# Patient Record
Sex: FEMALE | Race: WHITE | NOT HISPANIC OR LATINO | URBAN - NONMETROPOLITAN AREA
[De-identification: names, ages, dates, MRNs, and addresses within clinical notes are randomized per-mention and may not be internally consistent; named-entity substitution may affect disease eponyms.]

---

## 2019-05-15 ENCOUNTER — OFFICE VISIT (OUTPATIENT)
Dept: URBAN - NONMETROPOLITAN AREA CLINIC 18 | Facility: CLINIC | Age: 49
End: 2019-05-15
Payer: COMMERCIAL

## 2019-05-15 DIAGNOSIS — H18.613 KERATOCONUS, STABLE, BILATERAL: ICD-10-CM

## 2019-05-15 DIAGNOSIS — R51 HEADACHE: ICD-10-CM

## 2019-05-15 PROCEDURE — 92015 DETERMINE REFRACTIVE STATE: CPT | Performed by: OPTOMETRIST

## 2019-05-15 PROCEDURE — 92014 COMPRE OPH EXAM EST PT 1/>: CPT | Performed by: OPTOMETRIST

## 2019-05-15 ASSESSMENT — INTRAOCULAR PRESSURE
OS: 14
OD: 14

## 2019-05-15 ASSESSMENT — VISUAL ACUITY
OS: 20/70
OD: 20/30

## 2019-05-15 ASSESSMENT — KERATOMETRY
OS: 46.13
OD: 46.00

## 2019-05-15 NOTE — IMPRESSION/PLAN
Impression: Keratoconus, stable, bilateral: H18.613. Plan: Discussed diagnosis in detail with patient. Keratoconus OU, adelina today. Will monitor.

## 2019-05-15 NOTE — IMPRESSION/PLAN
Impression: Headache: R51. Plan: Continue care with PCP and neurologist. Low prism Rx to help alignment. Also education for potential lens tints to help reduce symptoms provided.

## 2019-05-15 NOTE — IMPRESSION/PLAN
Impression: Degenerative myopia, left eye: H44.22. Plan: Discussed diagnosis with patient and reviewed signs and symptoms to retinal detachment. Will monitor in 1 year with OCT-MAC.

## 2020-10-02 ENCOUNTER — OFFICE VISIT (OUTPATIENT)
Dept: URBAN - NONMETROPOLITAN AREA CLINIC 18 | Facility: CLINIC | Age: 50
End: 2020-10-02
Payer: COMMERCIAL

## 2020-10-02 DIAGNOSIS — H44.22 DEGENERATIVE MYOPIA, LEFT EYE: ICD-10-CM

## 2020-10-02 DIAGNOSIS — H52.13 MYOPIA, BILATERAL: Primary | ICD-10-CM

## 2020-10-02 DIAGNOSIS — H25.13 AGE-RELATED NUCLEAR CATARACT, BILATERAL: ICD-10-CM

## 2020-10-02 DIAGNOSIS — H35.412 LATTICE DEGENERATION OF RETINA, LEFT EYE: ICD-10-CM

## 2020-10-02 PROCEDURE — 92014 COMPRE OPH EXAM EST PT 1/>: CPT | Performed by: OPTOMETRIST

## 2020-10-02 ASSESSMENT — INTRAOCULAR PRESSURE
OD: 13
OS: 13

## 2020-10-02 ASSESSMENT — VISUAL ACUITY
OS: 20/50
OD: 20/70

## 2020-10-02 NOTE — IMPRESSION/PLAN
Impression: Keratoconus, stable, bilateral: H18.613. Plan: Discussed diagnosis in detail with patient. Keratoconus OU, adelina at last visit. Will monitor.

## 2020-10-02 NOTE — IMPRESSION/PLAN
Impression: Degenerative myopia, left eye: H44.22. Plan: Discussed diagnosis with patient and reviewed signs and symptoms to retinal detachment. OCT macula OU today, -SRNM. Will monitor in 1 year.

## 2021-10-20 ENCOUNTER — OFFICE VISIT (OUTPATIENT)
Dept: URBAN - NONMETROPOLITAN AREA CLINIC 18 | Facility: CLINIC | Age: 51
End: 2021-10-20
Payer: COMMERCIAL

## 2021-10-20 DIAGNOSIS — H35.3131 NONEXUDATIVE AGE-RELATED MACULAR DEGENERATION, BILATERAL, EARLY DRY STAGE: ICD-10-CM

## 2021-10-20 DIAGNOSIS — H44.23 DEGENERATIVE MYOPIA, BILATERAL: ICD-10-CM

## 2021-10-20 DIAGNOSIS — H02.539 EYELID RETRACTION: ICD-10-CM

## 2021-10-20 PROCEDURE — 92014 COMPRE OPH EXAM EST PT 1/>: CPT | Performed by: OPTOMETRIST

## 2021-10-20 ASSESSMENT — INTRAOCULAR PRESSURE
OD: 15
OS: 15

## 2021-10-20 ASSESSMENT — VISUAL ACUITY
OD: 20/40
OS: 20/50

## 2021-10-20 NOTE — IMPRESSION/PLAN
Impression: Degenerative myopia, bilateral: H44.23. Plan: Discussed diagnosis with patient and reviewed signs and symptoms to retinal detachment. Last OCT macula OU, -SRNM. Will monitor in 1 year.

## 2021-10-20 NOTE — IMPRESSION/PLAN
Impression: Eyelid retraction: H02.539. Plan: Recommend follow up with PCP for thyroid concern. +Family history of thyroid disease, paternal aunt.

## 2021-10-20 NOTE — IMPRESSION/PLAN
Impression: Keratoconus, stable, bilateral: H18.613. Plan: Discussed diagnosis in detail with patient. Keratoconus OU, adelina with auto today, stable vs last. Will monitor.

## 2022-05-27 ENCOUNTER — OFFICE VISIT (OUTPATIENT)
Dept: URBAN - NONMETROPOLITAN AREA CLINIC 18 | Facility: CLINIC | Age: 52
End: 2022-05-27
Payer: MEDICARE

## 2022-05-27 DIAGNOSIS — H18.613 KERATOCONUS, STABLE, BILATERAL: ICD-10-CM

## 2022-05-27 DIAGNOSIS — H04.123 DRY EYE SYNDROME OF BILATERAL LACRIMAL GLANDS: ICD-10-CM

## 2022-05-27 DIAGNOSIS — H44.22 DEGENERATIVE MYOPIA, LEFT EYE: ICD-10-CM

## 2022-05-27 DIAGNOSIS — H35.3131 NONEXUDATIVE AGE-RELATED MACULAR DEGENERATION, BILATERAL, EARLY DRY STAGE: Primary | ICD-10-CM

## 2022-05-27 DIAGNOSIS — H35.412 LATTICE DEGENERATION OF RETINA, LEFT EYE: ICD-10-CM

## 2022-05-27 PROCEDURE — 99213 OFFICE O/P EST LOW 20 MIN: CPT | Performed by: OPTOMETRIST

## 2022-05-27 PROCEDURE — 92134 CPTRZ OPH DX IMG PST SGM RTA: CPT | Performed by: OPTOMETRIST

## 2022-05-27 ASSESSMENT — INTRAOCULAR PRESSURE
OD: 13
OS: 14

## 2022-05-27 NOTE — IMPRESSION/PLAN
Impression: Nonexudative age-related macular degeneration, bilateral, early dry stage: H35.3131. Plan: Discussed condition in detail with patient with review of macula status and potential symptoms. Discussed Amsler grid monitoring. OCT macula today, stable. Patient knows to return to clinic immediately if any changes in vision are experienced for additional evaluation.

## 2022-05-27 NOTE — IMPRESSION/PLAN
Impression: Keratoconus, stable, bilateral: H18.613. Plan: Discussed diagnosis in detail with patient. Keratoconus OU,  stable. Repeat Sukumar at next exam.  Will monitor.

## 2022-05-27 NOTE — IMPRESSION/PLAN
Impression: Lattice degeneration of retina, left eye: H35.412. Plan: A thorough review of the ocular findings was discussed. Signs and symptoms of retinal detachment (new flashes, floaters, or peripheral vision changes) reviewed in detail. Patient knows to RTC immediately if symptoms are experienced.

## 2023-02-28 ENCOUNTER — OFFICE VISIT (OUTPATIENT)
Dept: URBAN - NONMETROPOLITAN AREA CLINIC 18 | Facility: CLINIC | Age: 53
End: 2023-02-28
Payer: MEDICARE

## 2023-02-28 DIAGNOSIS — H18.613 KERATOCONUS, STABLE, BILATERAL: Primary | ICD-10-CM

## 2023-02-28 DIAGNOSIS — H04.123 DRY EYE SYNDROME OF BILATERAL LACRIMAL GLANDS: ICD-10-CM

## 2023-02-28 DIAGNOSIS — H25.13 AGE-RELATED NUCLEAR CATARACT, BILATERAL: ICD-10-CM

## 2023-02-28 DIAGNOSIS — H35.412 LATTICE DEGENERATION OF RETINA, LEFT EYE: ICD-10-CM

## 2023-02-28 DIAGNOSIS — H44.23 DEGENERATIVE MYOPIA, BILATERAL: ICD-10-CM

## 2023-02-28 PROCEDURE — 92025 CPTRIZED CORNEAL TOPOGRAPHY: CPT | Performed by: OPTOMETRIST

## 2023-02-28 PROCEDURE — 99213 OFFICE O/P EST LOW 20 MIN: CPT | Performed by: OPTOMETRIST

## 2023-02-28 ASSESSMENT — INTRAOCULAR PRESSURE
OD: 18
OS: 16

## 2023-02-28 NOTE — IMPRESSION/PLAN
Impression: Keratoconus, stable, bilateral: H18.613. Plan: Discussed diagnosis in detail with patient. Keratoconus OU,  stable. Repeat Sukumar at next exam.  Will monitor, RTC if any change in vision experienced. If any progression,  consider CXL. Pataday qd to reduce itching. No rubbing eye.

## 2023-02-28 NOTE — IMPRESSION/PLAN
Impression: Degenerative myopia, bilateral: H44.23. Plan: All signs/symptoms and risks of retinal detachment and tears were discussed in detail. Patient instructed to call office immediately if any symptoms noted. OCT macula in 1 year.

## 2024-07-03 ENCOUNTER — OFFICE VISIT (OUTPATIENT)
Dept: URBAN - NONMETROPOLITAN AREA CLINIC 18 | Facility: CLINIC | Age: 54
End: 2024-07-03
Payer: MEDICARE

## 2024-07-03 DIAGNOSIS — H52.13 MYOPIA, BILATERAL: ICD-10-CM

## 2024-07-03 DIAGNOSIS — H18.613 KERATOCONUS, STABLE, BILATERAL: Primary | ICD-10-CM

## 2024-07-03 DIAGNOSIS — H44.23 DEGENERATIVE MYOPIA, BILATERAL: ICD-10-CM

## 2024-07-03 DIAGNOSIS — H35.3131 NONEXUDATIVE AGE-RELATED MACULAR DEGENERATION, BILATERAL, EARLY DRY STAGE: ICD-10-CM

## 2024-07-03 DIAGNOSIS — H25.813 COMBINED FORMS OF AGE-RELATED CATARACT, BILATERAL: ICD-10-CM

## 2024-07-03 DIAGNOSIS — H04.123 DRY EYE SYNDROME OF BILATERAL LACRIMAL GLANDS: ICD-10-CM

## 2024-07-03 DIAGNOSIS — H35.412 LATTICE DEGENERATION OF RETINA, LEFT EYE: ICD-10-CM

## 2024-07-03 PROCEDURE — 92025 CPTRIZED CORNEAL TOPOGRAPHY: CPT | Performed by: OPTOMETRIST

## 2024-07-03 PROCEDURE — 92014 COMPRE OPH EXAM EST PT 1/>: CPT | Performed by: OPTOMETRIST

## 2024-07-03 ASSESSMENT — VISUAL ACUITY
OD: 20/50
OS: 20/70

## 2024-07-03 ASSESSMENT — INTRAOCULAR PRESSURE
OS: 15
OD: 12

## 2025-07-15 ENCOUNTER — OFFICE VISIT (OUTPATIENT)
Dept: URBAN - NONMETROPOLITAN AREA CLINIC 18 | Facility: CLINIC | Age: 55
End: 2025-07-15
Payer: MEDICARE

## 2025-07-15 DIAGNOSIS — H35.412 LATTICE DEGENERATION OF RETINA, LEFT EYE: ICD-10-CM

## 2025-07-15 DIAGNOSIS — H25.813 COMBINED FORMS OF AGE-RELATED CATARACT, BILATERAL: ICD-10-CM

## 2025-07-15 DIAGNOSIS — H18.613 KERATOCONUS, STABLE, BILATERAL: Primary | ICD-10-CM

## 2025-07-15 DIAGNOSIS — H44.23 DEGENERATIVE MYOPIA, BILATERAL: ICD-10-CM

## 2025-07-15 DIAGNOSIS — H35.3131 NONEXUDATIVE AGE-RELATED MACULAR DEGENERATION, BILATERAL, EARLY DRY STAGE: ICD-10-CM

## 2025-07-15 PROCEDURE — 92025 CPTRIZED CORNEAL TOPOGRAPHY: CPT | Performed by: OPTOMETRIST

## 2025-07-15 PROCEDURE — 99213 OFFICE O/P EST LOW 20 MIN: CPT | Performed by: OPTOMETRIST

## 2025-07-15 PROCEDURE — 92134 CPTRZ OPH DX IMG PST SGM RTA: CPT | Performed by: OPTOMETRIST

## 2025-07-15 ASSESSMENT — INTRAOCULAR PRESSURE
OD: 13
OS: 13